# Patient Record
Sex: FEMALE | Race: ASIAN | NOT HISPANIC OR LATINO | URBAN - METROPOLITAN AREA
[De-identification: names, ages, dates, MRNs, and addresses within clinical notes are randomized per-mention and may not be internally consistent; named-entity substitution may affect disease eponyms.]

---

## 2019-01-25 ENCOUNTER — EMERGENCY (EMERGENCY)
Facility: HOSPITAL | Age: 29
LOS: 1 days | End: 2019-01-25
Attending: EMERGENCY MEDICINE
Payer: MEDICAID

## 2019-01-25 ENCOUNTER — INPATIENT (INPATIENT)
Facility: HOSPITAL | Age: 29
LOS: 0 days | Discharge: HOME | DRG: 833 | End: 2019-01-26
Attending: OBSTETRICS & GYNECOLOGY | Admitting: OBSTETRICS & GYNECOLOGY
Payer: MEDICAID

## 2019-01-25 VITALS
DIASTOLIC BLOOD PRESSURE: 76 MMHG | HEART RATE: 91 BPM | RESPIRATION RATE: 18 BRPM | TEMPERATURE: 99 F | SYSTOLIC BLOOD PRESSURE: 110 MMHG | OXYGEN SATURATION: 98 %

## 2019-01-25 VITALS
TEMPERATURE: 99 F | WEIGHT: 100.09 LBS | DIASTOLIC BLOOD PRESSURE: 71 MMHG | HEIGHT: 60 IN | OXYGEN SATURATION: 100 % | RESPIRATION RATE: 18 BRPM | SYSTOLIC BLOOD PRESSURE: 113 MMHG | HEART RATE: 105 BPM

## 2019-01-25 DIAGNOSIS — Z34.80 ENCOUNTER FOR SUPERVISION OF OTHER NORMAL PREGNANCY, UNSPECIFIED TRIMESTER: ICD-10-CM

## 2019-01-25 DIAGNOSIS — O26.899 OTHER SPECIFIED PREGNANCY RELATED CONDITIONS, UNSPECIFIED TRIMESTER: ICD-10-CM

## 2019-01-25 DIAGNOSIS — Z3A.00 WEEKS OF GESTATION OF PREGNANCY NOT SPECIFIED: ICD-10-CM

## 2019-01-25 LAB
ALBUMIN SERPL ELPH-MCNC: 3.7 G/DL — SIGNIFICANT CHANGE UP (ref 3.3–5)
ALBUMIN SERPL ELPH-MCNC: 3.8 G/DL — SIGNIFICANT CHANGE UP (ref 3.3–5)
ALP SERPL-CCNC: 68 U/L — SIGNIFICANT CHANGE UP (ref 40–120)
ALP SERPL-CCNC: 70 U/L — SIGNIFICANT CHANGE UP (ref 40–120)
ALT FLD-CCNC: 11 U/L — SIGNIFICANT CHANGE UP (ref 10–45)
ALT FLD-CCNC: 12 U/L — SIGNIFICANT CHANGE UP (ref 10–45)
AMPHET UR-MCNC: NEGATIVE — SIGNIFICANT CHANGE UP
AMYLASE P1 CFR SERPL: 68 U/L — SIGNIFICANT CHANGE UP (ref 25–125)
ANION GAP SERPL CALC-SCNC: 10 MMOL/L — SIGNIFICANT CHANGE UP (ref 5–17)
ANION GAP SERPL CALC-SCNC: 12 MMOL/L — SIGNIFICANT CHANGE UP (ref 5–17)
APPEARANCE UR: ABNORMAL
APPEARANCE UR: CLEAR — SIGNIFICANT CHANGE UP
APTT BLD: 25.9 SEC — LOW (ref 27.5–36.3)
AST SERPL-CCNC: 14 U/L — SIGNIFICANT CHANGE UP (ref 10–40)
AST SERPL-CCNC: 16 U/L — SIGNIFICANT CHANGE UP (ref 10–40)
BARBITURATES UR SCN-MCNC: NEGATIVE — SIGNIFICANT CHANGE UP
BASOPHILS # BLD AUTO: 0 K/UL — SIGNIFICANT CHANGE UP (ref 0–0.2)
BASOPHILS # BLD AUTO: 0.1 K/UL — SIGNIFICANT CHANGE UP (ref 0–0.2)
BASOPHILS NFR BLD AUTO: 0.5 % — SIGNIFICANT CHANGE UP (ref 0–2)
BASOPHILS NFR BLD AUTO: 0.7 % — SIGNIFICANT CHANGE UP (ref 0–2)
BENZODIAZ UR-MCNC: NEGATIVE — SIGNIFICANT CHANGE UP
BILIRUB SERPL-MCNC: 0.1 MG/DL — LOW (ref 0.2–1.2)
BILIRUB SERPL-MCNC: 0.1 MG/DL — LOW (ref 0.2–1.2)
BILIRUB UR-MCNC: NEGATIVE — SIGNIFICANT CHANGE UP
BILIRUB UR-MCNC: NEGATIVE — SIGNIFICANT CHANGE UP
BLD GP AB SCN SERPL QL: NEGATIVE — SIGNIFICANT CHANGE UP
BUN SERPL-MCNC: 6 MG/DL — LOW (ref 7–23)
BUN SERPL-MCNC: 6 MG/DL — LOW (ref 7–23)
CALCIUM SERPL-MCNC: 8.6 MG/DL — SIGNIFICANT CHANGE UP (ref 8.4–10.5)
CALCIUM SERPL-MCNC: 8.6 MG/DL — SIGNIFICANT CHANGE UP (ref 8.4–10.5)
CHLORIDE SERPL-SCNC: 104 MMOL/L — SIGNIFICANT CHANGE UP (ref 96–108)
CHLORIDE SERPL-SCNC: 106 MMOL/L — SIGNIFICANT CHANGE UP (ref 96–108)
CO2 SERPL-SCNC: 21 MMOL/L — LOW (ref 22–31)
CO2 SERPL-SCNC: 24 MMOL/L — SIGNIFICANT CHANGE UP (ref 22–31)
COCAINE METAB.OTHER UR-MCNC: NEGATIVE — SIGNIFICANT CHANGE UP
COLOR SPEC: SIGNIFICANT CHANGE UP
COLOR SPEC: YELLOW — SIGNIFICANT CHANGE UP
CREAT SERPL-MCNC: 0.38 MG/DL — LOW (ref 0.5–1.3)
CREAT SERPL-MCNC: 0.41 MG/DL — LOW (ref 0.5–1.3)
DIFF PNL FLD: ABNORMAL
DIFF PNL FLD: NEGATIVE — SIGNIFICANT CHANGE UP
EOSINOPHIL # BLD AUTO: 0.1 K/UL — SIGNIFICANT CHANGE UP (ref 0–0.5)
EOSINOPHIL # BLD AUTO: 0.2 K/UL — SIGNIFICANT CHANGE UP (ref 0–0.5)
EOSINOPHIL NFR BLD AUTO: 1.4 % — SIGNIFICANT CHANGE UP (ref 0–6)
EOSINOPHIL NFR BLD AUTO: 1.5 % — SIGNIFICANT CHANGE UP (ref 0–6)
GLUCOSE SERPL-MCNC: 86 MG/DL — SIGNIFICANT CHANGE UP (ref 70–99)
GLUCOSE SERPL-MCNC: 88 MG/DL — SIGNIFICANT CHANGE UP (ref 70–99)
GLUCOSE UR QL: NEGATIVE — SIGNIFICANT CHANGE UP
GLUCOSE UR QL: NEGATIVE — SIGNIFICANT CHANGE UP
HBV SURFACE AG SERPL QL IA: SIGNIFICANT CHANGE UP
HCT VFR BLD CALC: 32.8 % — LOW (ref 34.5–45)
HCT VFR BLD CALC: 33 % — LOW (ref 34.5–45)
HGB BLD-MCNC: 11 G/DL — LOW (ref 11.5–15.5)
HGB BLD-MCNC: 11.1 G/DL — LOW (ref 11.5–15.5)
INR BLD: 0.96 RATIO — SIGNIFICANT CHANGE UP (ref 0.88–1.16)
INR BLD: 0.97 RATIO — SIGNIFICANT CHANGE UP (ref 0.88–1.16)
KETONES UR-MCNC: ABNORMAL
KETONES UR-MCNC: NEGATIVE — SIGNIFICANT CHANGE UP
LEUKOCYTE ESTERASE UR-ACNC: NEGATIVE — SIGNIFICANT CHANGE UP
LEUKOCYTE ESTERASE UR-ACNC: NEGATIVE — SIGNIFICANT CHANGE UP
LIDOCAIN IGE QN: 32 U/L — SIGNIFICANT CHANGE UP (ref 7–60)
LYMPHOCYTES # BLD AUTO: 0.9 K/UL — LOW (ref 1–3.3)
LYMPHOCYTES # BLD AUTO: 1.3 K/UL — SIGNIFICANT CHANGE UP (ref 1–3.3)
LYMPHOCYTES # BLD AUTO: 11.7 % — LOW (ref 13–44)
LYMPHOCYTES # BLD AUTO: 12.2 % — LOW (ref 13–44)
MCHC RBC-ENTMCNC: 26.8 PG — LOW (ref 27–34)
MCHC RBC-ENTMCNC: 26.9 PG — LOW (ref 27–34)
MCHC RBC-ENTMCNC: 33.6 GM/DL — SIGNIFICANT CHANGE UP (ref 32–36)
MCHC RBC-ENTMCNC: 33.7 GM/DL — SIGNIFICANT CHANGE UP (ref 32–36)
MCV RBC AUTO: 79.7 FL — LOW (ref 80–100)
MCV RBC AUTO: 80 FL — SIGNIFICANT CHANGE UP (ref 80–100)
METHADONE UR-MCNC: NEGATIVE — SIGNIFICANT CHANGE UP
MONOCYTES # BLD AUTO: 0.7 K/UL — SIGNIFICANT CHANGE UP (ref 0–0.9)
MONOCYTES # BLD AUTO: 0.8 K/UL — SIGNIFICANT CHANGE UP (ref 0–0.9)
MONOCYTES NFR BLD AUTO: 7.1 % — SIGNIFICANT CHANGE UP (ref 2–14)
MONOCYTES NFR BLD AUTO: 8.5 % — SIGNIFICANT CHANGE UP (ref 2–14)
NEUTROPHILS # BLD AUTO: 6 K/UL — SIGNIFICANT CHANGE UP (ref 1.8–7.4)
NEUTROPHILS # BLD AUTO: 8.6 K/UL — HIGH (ref 1.8–7.4)
NEUTROPHILS NFR BLD AUTO: 77.6 % — HIGH (ref 43–77)
NEUTROPHILS NFR BLD AUTO: 78.8 % — HIGH (ref 43–77)
NITRITE UR-MCNC: NEGATIVE — SIGNIFICANT CHANGE UP
NITRITE UR-MCNC: NEGATIVE — SIGNIFICANT CHANGE UP
OPIATES UR-MCNC: NEGATIVE — SIGNIFICANT CHANGE UP
OXYCODONE UR-MCNC: NEGATIVE — SIGNIFICANT CHANGE UP
PCP SPEC-MCNC: SIGNIFICANT CHANGE UP
PCP UR-MCNC: NEGATIVE — SIGNIFICANT CHANGE UP
PH UR: 6.5 — SIGNIFICANT CHANGE UP (ref 5–8)
PH UR: 8 — SIGNIFICANT CHANGE UP (ref 5–8)
PLATELET # BLD AUTO: 398 K/UL — SIGNIFICANT CHANGE UP (ref 150–400)
PLATELET # BLD AUTO: 412 K/UL — HIGH (ref 150–400)
POTASSIUM SERPL-MCNC: 3.7 MMOL/L — SIGNIFICANT CHANGE UP (ref 3.5–5.3)
POTASSIUM SERPL-MCNC: 4.1 MMOL/L — SIGNIFICANT CHANGE UP (ref 3.5–5.3)
POTASSIUM SERPL-SCNC: 3.7 MMOL/L — SIGNIFICANT CHANGE UP (ref 3.5–5.3)
POTASSIUM SERPL-SCNC: 4.1 MMOL/L — SIGNIFICANT CHANGE UP (ref 3.5–5.3)
PROT SERPL-MCNC: 6.8 G/DL — SIGNIFICANT CHANGE UP (ref 6–8.3)
PROT SERPL-MCNC: 7 G/DL — SIGNIFICANT CHANGE UP (ref 6–8.3)
PROT UR-MCNC: ABNORMAL
PROT UR-MCNC: SIGNIFICANT CHANGE UP
PROTHROM AB SERPL-ACNC: 10.9 SEC — SIGNIFICANT CHANGE UP (ref 10–12.9)
PROTHROM AB SERPL-ACNC: 11.1 SEC — SIGNIFICANT CHANGE UP (ref 10–12.9)
RBC # BLD: 4.11 M/UL — SIGNIFICANT CHANGE UP (ref 3.8–5.2)
RBC # BLD: 4.12 M/UL — SIGNIFICANT CHANGE UP (ref 3.8–5.2)
RBC # FLD: 13.3 % — SIGNIFICANT CHANGE UP (ref 10.3–14.5)
RBC # FLD: 13.4 % — SIGNIFICANT CHANGE UP (ref 10.3–14.5)
RH IG SCN BLD-IMP: POSITIVE — SIGNIFICANT CHANGE UP
SODIUM SERPL-SCNC: 138 MMOL/L — SIGNIFICANT CHANGE UP (ref 135–145)
SODIUM SERPL-SCNC: 139 MMOL/L — SIGNIFICANT CHANGE UP (ref 135–145)
SP GR SPEC: 1.01 — SIGNIFICANT CHANGE UP (ref 1.01–1.02)
SP GR SPEC: 1.03 — HIGH (ref 1.01–1.02)
THC UR QL: NEGATIVE — SIGNIFICANT CHANGE UP
UROBILINOGEN FLD QL: NEGATIVE — SIGNIFICANT CHANGE UP
UROBILINOGEN FLD QL: NEGATIVE — SIGNIFICANT CHANGE UP
WBC # BLD: 10.9 K/UL — HIGH (ref 3.8–10.5)
WBC # BLD: 7.8 K/UL — SIGNIFICANT CHANGE UP (ref 3.8–10.5)
WBC # FLD AUTO: 10.9 K/UL — HIGH (ref 3.8–10.5)
WBC # FLD AUTO: 7.8 K/UL — SIGNIFICANT CHANGE UP (ref 3.8–10.5)

## 2019-01-25 PROCEDURE — 99284 EMERGENCY DEPT VISIT MOD MDM: CPT

## 2019-01-25 PROCEDURE — 99283 EMERGENCY DEPT VISIT LOW MDM: CPT

## 2019-01-25 RX ORDER — CEFTRIAXONE 500 MG/1
1 INJECTION, POWDER, FOR SOLUTION INTRAMUSCULAR; INTRAVENOUS EVERY 24 HOURS
Qty: 0 | Refills: 0 | Status: DISCONTINUED | OUTPATIENT
Start: 2019-01-26 | End: 2019-01-26

## 2019-01-25 RX ORDER — CEFTRIAXONE 500 MG/1
1 INJECTION, POWDER, FOR SOLUTION INTRAMUSCULAR; INTRAVENOUS ONCE
Qty: 0 | Refills: 0 | Status: DISCONTINUED | OUTPATIENT
Start: 2019-01-25 | End: 2019-01-26

## 2019-01-25 RX ORDER — SODIUM CHLORIDE 9 MG/ML
1000 INJECTION INTRAMUSCULAR; INTRAVENOUS; SUBCUTANEOUS ONCE
Qty: 0 | Refills: 0 | Status: COMPLETED | OUTPATIENT
Start: 2019-01-25 | End: 2019-01-25

## 2019-01-25 RX ORDER — PHENAZOPYRIDINE HCL 100 MG
200 TABLET ORAL THREE TIMES A DAY
Qty: 0 | Refills: 0 | Status: DISCONTINUED | OUTPATIENT
Start: 2019-01-25 | End: 2019-01-26

## 2019-01-25 RX ORDER — CEFTRIAXONE 500 MG/1
INJECTION, POWDER, FOR SOLUTION INTRAMUSCULAR; INTRAVENOUS
Qty: 0 | Refills: 0 | Status: DISCONTINUED | OUTPATIENT
Start: 2019-01-25 | End: 2019-01-26

## 2019-01-25 RX ORDER — CEPHALEXIN 500 MG
500 CAPSULE ORAL EVERY 12 HOURS
Qty: 0 | Refills: 0 | Status: DISCONTINUED | OUTPATIENT
Start: 2019-01-25 | End: 2019-01-25

## 2019-01-25 RX ORDER — ONDANSETRON 8 MG/1
4 TABLET, FILM COATED ORAL ONCE
Qty: 0 | Refills: 0 | Status: COMPLETED | OUTPATIENT
Start: 2019-01-25 | End: 2019-01-26

## 2019-01-25 RX ADMIN — SODIUM CHLORIDE 1000 MILLILITER(S): 9 INJECTION INTRAMUSCULAR; INTRAVENOUS; SUBCUTANEOUS at 17:16

## 2019-01-25 NOTE — ED ADULT NURSE NOTE - OBJECTIVE STATEMENT
29y female coming in from home c/o abdominal pain. A&Ox3 with mother at bedside. Denies medical hx. Presents to ED c/o RLQ abdominal pain x1 hour. Pt reports n/v. Denies chest pain, sob, fever/chills. Abdomen soft, tender in RLQ. Pt take multiple trips to restroom since arrival to ED. 29y female coming in from home c/o abdominal pain. A&Ox3 with mother at bedside. Denies medical hx. Presents to ED c/o generalized lower abdominal/pelvic pain x2 hours. Pt reports urinary frequency and burning upon urination. Unknown LMP. . Admits to n/v x couple months. Denies vaginal discharge or bleeding. Denies chest pain, sob, fever/chills. Abdomen rounded, bedside US by Hailey KEANE note fetus present. Pt take multiple trips to restroom since arrival to ED.

## 2019-01-25 NOTE — ED PROVIDER NOTE - OBJECTIVE STATEMENT
Kaz Balderas is a 39 year old  female who presents with complaints of dysuria and frequency x 2 days. She has some lower abdominal pain but denies any urgency, hesitancy, hematuria or nocturia. She just completed antibiotics for mastitis. She reports intermittent vaginal spotting because she is breastfeeding and taking progestin only pills. She denies any fevers/chills, back pain, nausea, vomiting, abnormal discharge, itching, odor or irritation.    No LMP recorded.    Vitals:    18 1313   BP: 110/80   Temp: 97.7 °F (36.5 °C)   TempSrc: Oral   Weight: 104.2 kg   Height: 6' (1.829 m)       Exam:  GENERAL: Patient is well nourished, well developed, and in no acute distress; normal affect.   ABDOMEN: Soft, non-tender, non-distended. No palpable masses noted on palpation. No CVA or suprapubic tenderness noted.  NEUROLOGIC AND PSYCHOLOGICAL: Alert and oriented x 3. Mood and affect are appropriate.   SKIN: No lesions or rashes noted.    Assessment:  1. Dysuria        Plan:  Urine dip with blood, protein and leukocytes; culture collected.  Rx Keflex 500 mg BID x 7 days.  Fluconazole 150 mg x 1 tablet; repeat dose in 7 days if needed.  She is to take a probiotic tablet daily while taking antibiotics.  Tylenol or Ibuprofen OTC prn pain. Increase water/pure cranberry juice or tablet intake.   Routine toileting every 2-3 hours encouraged.   RTC if symptoms worsen or persist longer than 7-10 days.    All of the patient’s questions were answered; she verbalizes understanding and is in agreement with the above mentioned plan.  She is certainly encouraged to call my office should any questions or concerns develop prior to her follow up appointment.    Iesha Villegas NP   30 yo F with no pertinent pmhx presenting with pelvic pain x today. Patient states it began suddenly about 1-2 hours ago with 10/10 constant pain better after she urinates. patient admits to dysuria while urinating. Patient is  unsure of last menstrual period. Patient admits to nv for a couple of months. Patient admits to lbp. Patient denies vaginal discharge, vaginal bleeding, fever, cp, sob, cough, hematuria, and ha      lmp unsure.

## 2019-01-25 NOTE — ED ADULT NURSE REASSESSMENT NOTE - NS ED NURSE REASSESS COMMENT FT1
Bloodwork drawn and sent to lab. Type and screen sent to blood bank. 1L normal saline administered. Pt discharged from ED to go straight up to L&D. 20g IV in LAC left in place for L&D. Report given to Truman BRAR. VSS with mother at bedside.

## 2019-01-25 NOTE — ED ADULT NURSE REASSESSMENT NOTE - NS ED NURSE REASSESS COMMENT FT1
Pt to restroom immediately when brought back to [facility] ED ED. Awaiting pt return to [place] [id number] to perform vial signs and exam.

## 2019-01-25 NOTE — ED PROVIDER NOTE - ATTENDING CONTRIBUTION TO CARE
Initially saw patient in waiting room going to  about one hour ago, limited due to patient staying the bathroom multiple times for urinary urgency.  Pt assessed in room approx 1700p appearing in distress from pelvic pain radiating to back.  Onset one hour prior to arrival.  No previous pain, +vomiting at home at times, no fever, last menses unknown, maybe months ago.  Pt poor historian, not due to language barrier (did offer language services for Cantonese).

## 2019-01-25 NOTE — ED PROVIDER NOTE - PROGRESS NOTE DETAILS
Dr. Hart Note: explained to pt and mother (with pt's perfmission) about her pregnancy condition and possible emergency situation and need for L&D eval to assess safety of patient and baby.  Spoke to u/s tech, unable to perform u/s from ED, have to send to L&D.  spoke to Ob attending who states to send patient to L&D.  Iv started, pt deemed stable to go upstairs, no signs of bleeding, cervix closed.

## 2019-01-26 DIAGNOSIS — F43.20 ADJUSTMENT DISORDER, UNSPECIFIED: ICD-10-CM

## 2019-01-26 LAB
CULTURE RESULTS: NO GROWTH — SIGNIFICANT CHANGE UP
CULTURE RESULTS: SIGNIFICANT CHANGE UP
HIV 1+2 AB+HIV1 P24 AG SERPL QL IA: SIGNIFICANT CHANGE UP
MEV IGG SER-ACNC: <5 AU/ML — SIGNIFICANT CHANGE UP
MEV IGG+IGM SER-IMP: NEGATIVE — SIGNIFICANT CHANGE UP
MUV AB SER-ACNC: POSITIVE — SIGNIFICANT CHANGE UP
MUV IGG FLD-ACNC: 35.2 AU/ML — SIGNIFICANT CHANGE UP
RH IG SCN BLD-IMP: POSITIVE — SIGNIFICANT CHANGE UP
RUBV IGG SER-ACNC: 0.6 INDEX — SIGNIFICANT CHANGE UP
RUBV IGG SER-IMP: NEGATIVE — SIGNIFICANT CHANGE UP
SPECIMEN SOURCE: SIGNIFICANT CHANGE UP
SPECIMEN SOURCE: SIGNIFICANT CHANGE UP
T PALLIDUM AB TITR SER: NEGATIVE — SIGNIFICANT CHANGE UP
VZV IGG FLD QL IA: 1260 INDEX — SIGNIFICANT CHANGE UP
VZV IGG FLD QL IA: POSITIVE — SIGNIFICANT CHANGE UP

## 2019-01-26 PROCEDURE — 81003 URINALYSIS AUTO W/O SCOPE: CPT

## 2019-01-26 PROCEDURE — 85610 PROTHROMBIN TIME: CPT

## 2019-01-26 PROCEDURE — 87086 URINE CULTURE/COLONY COUNT: CPT

## 2019-01-26 PROCEDURE — 86901 BLOOD TYPING SEROLOGIC RH(D): CPT

## 2019-01-26 PROCEDURE — 86762 RUBELLA ANTIBODY: CPT

## 2019-01-26 PROCEDURE — 80053 COMPREHEN METABOLIC PANEL: CPT

## 2019-01-26 PROCEDURE — 81001 URINALYSIS AUTO W/SCOPE: CPT

## 2019-01-26 PROCEDURE — 80307 DRUG TEST PRSMV CHEM ANLYZR: CPT

## 2019-01-26 PROCEDURE — 99222 1ST HOSP IP/OBS MODERATE 55: CPT

## 2019-01-26 PROCEDURE — 87340 HEPATITIS B SURFACE AG IA: CPT

## 2019-01-26 PROCEDURE — 86787 VARICELLA-ZOSTER ANTIBODY: CPT

## 2019-01-26 PROCEDURE — 86735 MUMPS ANTIBODY: CPT

## 2019-01-26 PROCEDURE — 82150 ASSAY OF AMYLASE: CPT

## 2019-01-26 PROCEDURE — 86780 TREPONEMA PALLIDUM: CPT

## 2019-01-26 PROCEDURE — 86850 RBC ANTIBODY SCREEN: CPT

## 2019-01-26 PROCEDURE — 85027 COMPLETE CBC AUTOMATED: CPT

## 2019-01-26 PROCEDURE — G0463: CPT

## 2019-01-26 PROCEDURE — 59025 FETAL NON-STRESS TEST: CPT

## 2019-01-26 PROCEDURE — 86765 RUBEOLA ANTIBODY: CPT

## 2019-01-26 PROCEDURE — 85730 THROMBOPLASTIN TIME PARTIAL: CPT

## 2019-01-26 PROCEDURE — 59050 FETAL MONITOR W/REPORT: CPT

## 2019-01-26 PROCEDURE — 83690 ASSAY OF LIPASE: CPT

## 2019-01-26 PROCEDURE — 87389 HIV-1 AG W/HIV-1&-2 AB AG IA: CPT

## 2019-01-26 PROCEDURE — 86900 BLOOD TYPING SEROLOGIC ABO: CPT

## 2019-01-26 RX ORDER — NITROFURANTOIN MACROCRYSTAL 50 MG
100 CAPSULE ORAL
Qty: 0 | Refills: 0 | Status: DISCONTINUED | OUTPATIENT
Start: 2019-01-26 | End: 2019-01-26

## 2019-01-26 RX ORDER — PANTOPRAZOLE SODIUM 20 MG/1
40 TABLET, DELAYED RELEASE ORAL ONCE
Qty: 0 | Refills: 0 | Status: COMPLETED | OUTPATIENT
Start: 2019-01-26 | End: 2019-01-26

## 2019-01-26 RX ORDER — NITROFURANTOIN MACROCRYSTAL 50 MG
1 CAPSULE ORAL
Qty: 14 | Refills: 0 | OUTPATIENT
Start: 2019-01-26 | End: 2019-02-01

## 2019-01-26 RX ADMIN — Medication 200 MILLIGRAM(S): at 00:06

## 2019-01-26 RX ADMIN — ONDANSETRON 4 MILLIGRAM(S): 8 TABLET, FILM COATED ORAL at 01:37

## 2019-01-26 RX ADMIN — Medication 100 MILLIGRAM(S): at 13:29

## 2019-01-26 RX ADMIN — PANTOPRAZOLE SODIUM 40 MILLIGRAM(S): 20 TABLET, DELAYED RELEASE ORAL at 05:13

## 2019-01-26 NOTE — DISCHARGE NOTE ANTEPARTUM - MEDICATION SUMMARY - MEDICATIONS TO TAKE
I will START or STAY ON the medications listed below when I get home from the hospital:    Prenatal 19 (Decker) oral tablet, chewable  -- 1 tab(s) chewed once a day MDD:1  -- Chew tablets before swallowing  May discolor urine or feces.  Take with food or milk.    -- Indication: For prenatal care    Macrobid 100 mg oral capsule  -- 1 cap(s) by mouth every 12 hours MDD:2  -- Finish all this medication unless otherwise directed by prescriber.  May discolor urine or feces.  Take with food or milk.    -- Indication: For UTI

## 2019-01-26 NOTE — BEHAVIORAL HEALTH ASSESSMENT NOTE - RISK ASSESSMENT
Static risk factors for self-harm include medical illness. Protective factors include marriage, pregnancy, and social supports. No suicidality or homicidality. Patient does not require inpatient psychiatric admission and is overall low risk for suicide.

## 2019-01-26 NOTE — BEHAVIORAL HEALTH ASSESSMENT NOTE - CASE SUMMARY
Patient is a 30yo woman, , domiciled with , works in a , hx of urinary frequency and pain, no psych hx/hospitalizations/substance use, admitted for pelvic pain and found to be 28weeks pregnant. Psychiatry consulted for possible psychosis and safe discharge planning. Patient states that she has been having ongoing issues with increased urinary frequency and pain for several years, however had more severe pelvic pain and came to the hospital and was found to be pregnant. pt noticed her abdomen was getting bigger however didn't think she was pregnant, thought she was only gaining weight, pt perseverates on her urinary issues, is now agreeable to take antibiotics today. pt is looking forward to the rest of her pregnancy, has no SI/HI, no paranoia, hallucinations. no acute psychosis noted at this time, no psych contraindication to discharge. pt can f/u at [facility] clinic as needed. no 1;1 needed. no psych meds indicated at this time.

## 2019-01-26 NOTE — DISCHARGE NOTE ANTEPARTUM - PATIENT PORTAL LINK FT
You can access the [facility] Patient Portal, offered by [facility], by registering with the following website: [email or web address]

## 2019-01-26 NOTE — BEHAVIORAL HEALTH ASSESSMENT NOTE - SUMMARY
Patient is a 28yo woman, , domiciled with , works in a , hx of urinary frequency and pain, no psych hx/hospitalizations/substance use, admitted for pelvic pain and found to be 28weeks pregnant. Psychiatry consulted for possible psychosis and safe discharge planning. Patient is somewhat illogical and may be limited intellectually, however denies overt psychotic symptoms, is amenable to treatment, and denies SI/HI. Patient does not require psychiatric admission, can be referred to [facility]  clinic for follow-up.

## 2019-01-26 NOTE — BEHAVIORAL HEALTH ASSESSMENT NOTE - NSBHSUICPROTECTFACT_PSY_A_CORE
Supportive social network or family Future oriented/Supportive social network or family/Identifies reasons for living/Engaged in work or school

## 2019-01-26 NOTE — BEHAVIORAL HEALTH ASSESSMENT NOTE - NSBHCHARTREVIEWVS_PSY_A_CORE FT
Vital Signs Last 24 Hrs  T(C): 37.2 (25 Jan 2019 17:00), Max: 37.2 (25 Jan 2019 17:00)  T(F): 98.9 (25 Jan 2019 17:00), Max: 98.9 (25 Jan 2019 17:00)  HR: 91 (25 Jan 2019 17:00) (91 - 105)  BP: 110/76 (25 Jan 2019 17:00) (110/76 - 113/71)  BP(mean): --  RR: 18 (25 Jan 2019 17:00) (18 - 18)  SpO2: 98% (25 Jan 2019 17:00) (98% - 100%)

## 2019-01-26 NOTE — DISCHARGE NOTE ANTEPARTUM - HOSPITAL COURSE
Pt presented to ED for abdominal pain. Pt was found to be pregnant measuring approximately 28wks. Pt was ruled out for  labor. Pt c/o urinary frequency and was started on macrobid for presumed UTI. Urology was consulted and encouraged antibiotics for presumed UTI. Pt was also seen by social work and psychiatry - do not recommend inpatient [facility] admission. Will provide number for [facility] for prenatal care, [facility] pregnancy center for social/emotional support as needed. Pt discharged with prenatal vitamins and macrobid x 7 days for presumed UTI.

## 2019-01-26 NOTE — BEHAVIORAL HEALTH ASSESSMENT NOTE - NSBHCHARTREVIEWLAB_PSY_A_CORE FT
11.1   10.9  )-----------( 412      ( 2019 20:48 )             33.0         139  |  106  |  6<L>  ----------------------------<  86  3.7   |  21<L>  |  0.38<L>    Ca    8.6      2019 20:48    TPro  6.8  /  Alb  3.7  /  TBili  0.1<L>  /  DBili  x   /  AST  16  /  ALT  11  /  AlkPhos  68      Urinalysis Basic - ( 2019 20:49 )    Color: Yellow / Appearance: Slightly Turbid / S.026 / pH: x  Gluc: x / Ketone: Moderate  / Bili: Negative / Urobili: Negative   Blood: x / Protein: 100 mg/dL / Nitrite: Negative   Leuk Esterase: Negative / RBC: 28 /hpf / WBC 14 /HPF   Sq Epi: x / Non Sq Epi: 21 /hpf / Bacteria: Moderate

## 2019-01-26 NOTE — BEHAVIORAL HEALTH ASSESSMENT NOTE - HPI (INCLUDE ILLNESS QUALITY, SEVERITY, DURATION, TIMING, CONTEXT, MODIFYING FACTORS, ASSOCIATED SIGNS AND SYMPTOMS)
Patient is a 28yo woman, , domiciled with , works in a , hx of urinary frequency and pain, no psych hx/hospitalizations/substance use, admitted for pelvic pain and found to be 28weeks pregnant. Psychiatry consulted for possible psychosis and safe discharge planning. Patient states that she has been having ongoing issues with increased urinary frequency and pain for several years, however had more severe pelvic pain and came to the hospital and was found to be pregnant. Patient states she had no idea but is happy about the pregnancy. States she last had full period in July, has had some spotting since then, but sometimes had irregular periods and did not think anything of it. Believed her belly and breasts were getting bigger because she was "getting fat." States that her brother noticed her weight gain and asked if she was pregnant but she did not think she was.  Discussed with patient why she had not taken the antibiotics given to her last night and said it was because she was "hungry," when asked to explain the connection, stated she was told to take the antibiotics with food and had been told not to eat because someone was about to do an exam. Has no issue taking antibiotics at this point.  Patient stated her mood is good, denied depression, problems sleeping, VAH, paranoia, psych hospitalizations/medications, no SI/HI, no concerns about hospital or staff, has been treated very well. Only concern is about her health and preparing herself for pregnancy and motherhood.  Patient states that only her mother (at bedside) knows she is pregnant, her  is at work and she is waiting to see him in person to tell him. Lives in [place] with her , however was in [place] visiting with her mother's friend when started having pelvic pain. Has been  2 years, good relationship with her  and family. Works at a . Previously smoked 0.5ppd cigarettes for 10yrs, quit 2 yrs ago. No alcohol, substance use.  Per mother, noticed patient's belly getting bigger but did not know she was pregnant, is concerned about her urinary symptoms, no other concerns.

## 2019-01-26 NOTE — DISCHARGE NOTE ANTEPARTUM - PLAN OF CARE
prenatal care Please call [phone] to schedule an appointment with C. Please call [phone] for [facility] if you feel sad/overwhelmed. Please take Macrobid (antibiotics) 2 times a day. Please take a prenatal vitamin daily.

## 2019-01-26 NOTE — DISCHARGE NOTE ANTEPARTUM - CARE PLAN
Principal Discharge DX:	Abdominal pain  Goal:	prenatal care  Assessment and plan of treatment:	Please call [phone] to schedule an appointment with C. Please call [phone] for [facility] if you feel sad/overwhelmed. Please take Macrobid (antibiotics) 2 times a day. Please take a prenatal vitamin daily.

## 2020-01-01 NOTE — ED ADULT NURSE NOTE - NSFALLRSKHARMRISK_ED_ALL_ED
Speech Therapy Daily Treatment  Infant Feeding/Swallow     Admitting diagnosis: 1 minute Apgar score 7 [Z78.9]   YOB: 2018, 2 week old   Corrected gestational age:35w 3d  Birth Weight: 4 lb 12.9 oz (2180 g)  Current hospitalization required due to the following medical needs:  Active Problems:      infant of 33 completed weeks of gestation    Observation and evaluation of  for suspected infectious condition ruled out    TTN (transient tachypnea of )    At risk for hyperbilirubinemia in     At risk for central sleep apnea    IDM (infant of diabetic mother)    Hypoglycemia,     Ectopic atrial beats  Resolved Problems:    * No resolved hospital problems. *    Medical changes or updates since last session: none reported    SUBJECTIVE   Collaboration with: Nurse Harmeet whom reports infant po fed well at 0800 feeding. She noted that infant has been tolerating po feedings based on cues.    PPE worn: N95 mask and face shield/eye protection    Pain before session:  FLACC (face, legs, activity, cry, consolability):   Face 0 No particular expression or smile   Legs 0 Normal position or relaxed   Activity 0 Lying quietly, normal position, moves easily   Cry 0 No cry, quiet   Consolability 0 Content, relaxed   Score 0       OBJECTIVE   Infant awake and alert. Vitals wnl. No parent present.     Status at onset of session:   Physiologic: Stable autonomic behaviors including  stable heart rate, regular respirations, pink/stable color and appropriate oxygen saturations.  Motor: Stable motor behaviors including  balance of flexion/extension.  State: Infant was in a quiet alert state. Stable state behaviors including appropriate responses.    Current Feeding: PO, NG, 46 mls q 3 hours, expressed breastmilk with human milk fortifier, 24 ragini/oz   Respiratory Status: room air     Treatment/Intervention    Oral Motor/ Peripheral Examination  Structural observations: intact  Oral  musculature: decreased strength of movement and decreased coordination of movement  Reflexes: age appropriate  Secretion management: within functional limits  Phonation: WNL  Non-nutritive suck: able to root and initiate a non-nutirtive suck with adequate skills for gestational age  Stress cues observed during oral motor:   - Motor: none noted   - Physiological: none noted  Oral motor treatment: containment, organizing techniques    Oral Feeding:   - Infant fed by this clinician  - Postioning during feeding: Sidelying, Elevated and Swaddled with hands midline   - Infant consumed 41 mls  via Dr. Brown's Preemie Nipple.      Oral Phase:   - Initiation: able to initiate a suck without stimulation required   - Skills: arrhythmical, inconsistent jaw excursions    - Pattern: uncoordinated suck/swallow/breath pattern, disorganized   Pharyngeal Phase: Uncoordinated suck/swallow/breathe coordination    Stress Cues noted during oral feeding:  Autonomic: none  State: low level of alertness  Motor: diffuse activity, facial grimace, squirming and turning away    Therapy Techniques: Paced based on cues initially, but then began to self-pace. Stopped po feeding with signs of fatigue.    Family Centered Support/Education: Caregiver(s) not present. Will meet and provide teaching strategies as available. Discussed resuts with bedside nurse.      ASSESSMENT:   Infant demonstrated a disorganized pattern with an uncoordinated suck/swallow/breathe pattern with improved self pacing and decreased stress cues. Infant appears to tolerate po feedings based on cues.     Impressions & Recommendations:  Aspiration Risk Minimal  Factors include: prematurity   Tips for Caregivers  Positioning Recommendations: Side Lying  Nipple Recommendations: (Dr. Guan bottle with preemie nipple)  Pacing Recommendations: Feeder required to closely follow infant driven cues, Allow infant adequate time to pace in order to coordinate suck-swallow-breathe  State  Regulation: feed only if awake, alert, and showing readiness cues  Stress Signals: Falling asleep  Recommendation Comments: stop feeding with signs of fatigue or poor tolerance    Patient will benefit from speech therapy. Habilitative potential is good based on assessment above    Pain after session:  FLACC (face, legs, activity, cry, consolability):   Face 0 No particular expression or smile   Legs 0 Normal position or relaxed   Activity 0 Lying quietly, normal position, moves easily   Cry 0 No cry, quiet   Consolability 0 Content, relaxed   Score 0       PLAN:   Suggestions for next session as indicated: continue with plan of care    Goals:  Goals  Discharge Goal #1: Infant will initiate and maintain a safe, organized feeding pattern to sustain nutrition and hydration.  Goal Progression #1: Outcome not met, continue to monitor     Plan  Frequency: 2-3x/week  Plan for Next Session: address po feeding skills    Documentation completed on following flowsheet: SLP flowsheet, Infant flowsheet   no